# Patient Record
Sex: FEMALE | Race: BLACK OR AFRICAN AMERICAN | NOT HISPANIC OR LATINO | URBAN - METROPOLITAN AREA
[De-identification: names, ages, dates, MRNs, and addresses within clinical notes are randomized per-mention and may not be internally consistent; named-entity substitution may affect disease eponyms.]

---

## 2017-04-10 ENCOUNTER — EMERGENCY (EMERGENCY)
Facility: HOSPITAL | Age: 27
LOS: 1 days | Discharge: HOME | End: 2017-04-10
Attending: EMERGENCY MEDICINE
Payer: COMMERCIAL

## 2017-04-10 VITALS
DIASTOLIC BLOOD PRESSURE: 64 MMHG | TEMPERATURE: 98 F | RESPIRATION RATE: 18 BRPM | OXYGEN SATURATION: 100 % | HEIGHT: 64 IN | WEIGHT: 136.91 LBS | HEART RATE: 119 BPM | SYSTOLIC BLOOD PRESSURE: 124 MMHG

## 2017-04-10 VITALS — HEART RATE: 88 BPM | OXYGEN SATURATION: 100 % | RESPIRATION RATE: 18 BRPM

## 2017-04-10 DIAGNOSIS — F41.9 ANXIETY DISORDER, UNSPECIFIED: ICD-10-CM

## 2017-04-10 PROCEDURE — 93005 ELECTROCARDIOGRAM TRACING: CPT

## 2017-04-10 PROCEDURE — 99283 EMERGENCY DEPT VISIT LOW MDM: CPT | Mod: 25

## 2017-04-10 PROCEDURE — 99283 EMERGENCY DEPT VISIT LOW MDM: CPT

## 2017-04-10 RX ORDER — ALPRAZOLAM 0.25 MG
0.25 TABLET ORAL ONCE
Qty: 0 | Refills: 0 | Status: DISCONTINUED | OUTPATIENT
Start: 2017-04-10 | End: 2017-04-10

## 2017-04-10 NOTE — ED PROVIDER NOTE - NS ED MD SCRIBE ATTENDING SCRIBE SECTIONS
REVIEW OF SYSTEMS/VITAL SIGNS( Pullset)/PAST MEDICAL/SURGICAL/SOCIAL HISTORY/HIV/DISPOSITION/HISTORY OF PRESENT ILLNESS/PHYSICAL EXAM

## 2017-04-10 NOTE — ED PROVIDER NOTE - OBJECTIVE STATEMENT
25 y/o F pt w/ PMHx of Anxiety presents to ED c/o anxiety and palpitations (heart beat, rapid) today. Pt states that she has been in therapy and has not had to take Xanax in a long time. Pt denies chest pain, fever, nausea, vomiting, calf pain, or any other complaints. Pt reports no recent travel or family history of heart problems. NKDA.

## 2017-04-10 NOTE — ED PROVIDER NOTE - CONSTITUTIONAL, MLM
normal... Anxious appearing, well nourished, awake, alert, oriented to person, place, time/situation and in no apparent distress.